# Patient Record
Sex: FEMALE | Race: WHITE | NOT HISPANIC OR LATINO
[De-identification: names, ages, dates, MRNs, and addresses within clinical notes are randomized per-mention and may not be internally consistent; named-entity substitution may affect disease eponyms.]

---

## 2022-02-15 ENCOUNTER — APPOINTMENT (OUTPATIENT)
Dept: ENDOCRINOLOGY | Facility: CLINIC | Age: 37
End: 2022-02-15

## 2022-03-02 ENCOUNTER — APPOINTMENT (OUTPATIENT)
Dept: ENDOCRINOLOGY | Facility: CLINIC | Age: 37
End: 2022-03-02
Payer: MEDICAID

## 2022-03-02 VITALS
WEIGHT: 207 LBS | HEIGHT: 63 IN | OXYGEN SATURATION: 98 % | DIASTOLIC BLOOD PRESSURE: 80 MMHG | SYSTOLIC BLOOD PRESSURE: 102 MMHG | HEART RATE: 83 BPM | BODY MASS INDEX: 36.68 KG/M2

## 2022-03-02 DIAGNOSIS — L65.9 NONSCARRING HAIR LOSS, UNSPECIFIED: ICD-10-CM

## 2022-03-02 DIAGNOSIS — E66.9 OBESITY, UNSPECIFIED: ICD-10-CM

## 2022-03-02 DIAGNOSIS — Z56.0 UNEMPLOYMENT, UNSPECIFIED: ICD-10-CM

## 2022-03-02 PROCEDURE — 99204 OFFICE O/P NEW MOD 45 MIN: CPT

## 2022-03-02 RX ORDER — AZELASTINE HYDROCHLORIDE 137 UG/1
0.1 SPRAY, METERED NASAL
Refills: 0 | Status: ACTIVE | COMMUNITY

## 2022-03-02 SDOH — ECONOMIC STABILITY - INCOME SECURITY: UNEMPLOYMENT, UNSPECIFIED: Z56.0

## 2022-03-02 NOTE — PHYSICAL EXAM
[Obese] : obese [No Acute Distress] : no acute distress [Normal Sclera/Conjunctiva] : normal sclera/conjunctiva [No Proptosis] : no proptosis [No Neck Mass] : no neck mass was observed [No LAD] : no lymphadenopathy [Supple] : the neck was supple [Thyroid Not Enlarged] : the thyroid was not enlarged [No Thyroid Nodules] : no palpable thyroid nodules [No Respiratory Distress] : no respiratory distress [Clear to Auscultation] : lungs were clear to auscultation bilaterally [Normal S1, S2] : normal S1 and S2 [No Murmurs] : no murmurs [Normal Rate] : heart rate was normal [Regular Rhythm] : with a regular rhythm [No Edema] : no peripheral edema [Normal Gait] : normal gait [No Clubbing, Cyanosis] : no clubbing  or cyanosis of the fingernails [Acanthosis Nigricans] : no acanthosis nigricans [No Tremors] : no tremors [Normal Affect] : the affect was normal [Normal Insight/Judgement] : insight and judgment were intact [Normal Mood] : the mood was normal [de-identified] : + frontal hair thinning as well as patch at posterior of scalp.

## 2022-03-02 NOTE — REVIEW OF SYSTEMS
[Fatigue] : fatigue [Recent Weight Gain (___ Lbs)] : recent weight gain: [unfilled] lbs [Shortness Of Breath] : shortness of breath [Myalgia] : myalgia  [Acne] : acne [Hair Loss] : hair loss [Anxiety] : anxiety [Cold Intolerance] : cold intolerance [Blurred Vision] : no blurred vision [Chest Pain] : no chest pain [Palpitations] : no palpitations [Constipation] : no constipation [Abdominal Pain] : no abdominal pain [Irregular Menses] : regular menses [Muscle Weakness] : no muscle weakness [Headaches] : no headaches [Tremors] : no tremors [Easy Bruising] : no tendency for easy bruising

## 2022-03-02 NOTE — HISTORY OF PRESENT ILLNESS
[FreeTextEntry1] : Patient presents today for the evaluation of obesity/ weight gain and hair loss.\par 37 year old female with no significant PMH.\par Over the past year complains of patches of hair loss on her scalp.\par Over the past 6 months complains of weight gain of about 40 pounds.  \par Has tried dietary modification and exercise in the past (2017) and lost 30- 40 pounds, but then regained all weight 3-4 months after.  Has never tried a formalized weight loss program.  Reports that sister and mother are also overweight.  \par Denies any associated bruising, stretch marks or muscle weakness.\par Reports that menses are regular.  Does have some acne at times, but denies hirsutism.  \par

## 2022-03-02 NOTE — ASSESSMENT
[FreeTextEntry1] : 37 year old female here for evaluation of obesity and hair loss.   \par \par 1.  Obesity-  likely due to lifestyle factors and genetics, but due to her recent symptoms of hair loss, will order 24 hour Urine cortisol to rule out Cushing's syndrome and check baseline TFTs.   I have advised that she join a formal weight loss program such as weight watchers as she has had some success with dieting in the past.  I will see her back in 6 months and if no improvement with aggressive lifestyle measures, then we could consider a trial of GLP-1 agonist therapy.  Will check baseline A1c and lipids.  \par \par 2.  Hair loss-  advised dermatologic consultation as this could represent alopecia areata given the discrete patches of hair loss, but will draw baseline labs to assess for androgenic causes of hair loss.

## 2022-09-09 LAB — TSH SERPL-ACNC: 2.13

## 2022-09-12 ENCOUNTER — APPOINTMENT (OUTPATIENT)
Dept: ENDOCRINOLOGY | Facility: CLINIC | Age: 37
End: 2022-09-12